# Patient Record
Sex: FEMALE | Race: BLACK OR AFRICAN AMERICAN | URBAN - METROPOLITAN AREA
[De-identification: names, ages, dates, MRNs, and addresses within clinical notes are randomized per-mention and may not be internally consistent; named-entity substitution may affect disease eponyms.]

---

## 2020-06-08 ENCOUNTER — LAB OUTSIDE AN ENCOUNTER (OUTPATIENT)
Dept: URBAN - METROPOLITAN AREA CLINIC 82 | Facility: CLINIC | Age: 80
End: 2020-06-08

## 2020-06-08 LAB
CREATININE POC: 1.3
PERFORMING LAB: (no result)

## 2020-07-10 ENCOUNTER — OFFICE VISIT (OUTPATIENT)
Dept: URBAN - METROPOLITAN AREA SURGERY CENTER 13 | Facility: SURGERY CENTER | Age: 80
End: 2020-07-10
Payer: MEDICARE

## 2020-07-10 DIAGNOSIS — Z86.010 H/O ADENOMATOUS POLYP OF COLON: ICD-10-CM

## 2020-07-10 PROCEDURE — G0105 COLORECTAL SCRN; HI RISK IND: HCPCS | Performed by: INTERNAL MEDICINE

## 2020-07-10 PROCEDURE — G9936 PMH PLYP/NEO CO/RECT/JUN/ANS: HCPCS | Performed by: INTERNAL MEDICINE

## 2020-07-10 PROCEDURE — G8907 PT DOC NO EVENTS ON DISCHARG: HCPCS | Performed by: INTERNAL MEDICINE

## 2020-07-10 RX ORDER — LISINOPRIL 10 MG/1
TAKE 1 TABLET (10 MG) BY ORAL ROUTE ONCE DAILY TABLET ORAL 1
Qty: 0 | Refills: 0 | Status: ACTIVE | COMMUNITY
Start: 1900-01-01 | End: 1900-01-01

## 2021-10-05 ENCOUNTER — TELEPHONE ENCOUNTER (OUTPATIENT)
Dept: URBAN - METROPOLITAN AREA CLINIC 82 | Facility: CLINIC | Age: 81
End: 2021-10-05

## 2021-10-05 ENCOUNTER — OFFICE VISIT (OUTPATIENT)
Dept: URBAN - METROPOLITAN AREA CLINIC 82 | Facility: CLINIC | Age: 81
End: 2021-10-05
Payer: MEDICARE

## 2021-10-05 ENCOUNTER — LAB OUTSIDE AN ENCOUNTER (OUTPATIENT)
Dept: URBAN - METROPOLITAN AREA CLINIC 82 | Facility: CLINIC | Age: 81
End: 2021-10-05

## 2021-10-05 VITALS
SYSTOLIC BLOOD PRESSURE: 170 MMHG | BODY MASS INDEX: 21.3 KG/M2 | HEIGHT: 70 IN | HEART RATE: 101 BPM | TEMPERATURE: 97.2 F | WEIGHT: 148.8 LBS | DIASTOLIC BLOOD PRESSURE: 84 MMHG

## 2021-10-05 DIAGNOSIS — Z86.010 PERSONAL HISTORY OF COLON POLYPS: ICD-10-CM

## 2021-10-05 DIAGNOSIS — Z80.0 FAMILY HISTORY OF PANCREATIC CANCER: ICD-10-CM

## 2021-10-05 DIAGNOSIS — K86.1 CHRONIC PANCREATITIS: ICD-10-CM

## 2021-10-05 DIAGNOSIS — D49.0 IPMN (INTRADUCTAL PAPILLARY MUCINOUS NEOPLASM): ICD-10-CM

## 2021-10-05 PROCEDURE — G8418 CALC BMI BLW LOW PARAM F/U: HCPCS | Performed by: INTERNAL MEDICINE

## 2021-10-05 PROCEDURE — G9903 PT SCRN TBCO ID AS NON USER: HCPCS | Performed by: INTERNAL MEDICINE

## 2021-10-05 PROCEDURE — 99214 OFFICE O/P EST MOD 30 MIN: CPT | Performed by: INTERNAL MEDICINE

## 2021-10-05 PROCEDURE — G8427 DOCREV CUR MEDS BY ELIG CLIN: HCPCS | Performed by: INTERNAL MEDICINE

## 2021-10-05 RX ORDER — PARICALCITOL 1 UG/1
1 CAPSULE CAPSULE, LIQUID FILLED ORAL
Status: ACTIVE | COMMUNITY

## 2021-10-05 RX ORDER — AMLODIPINE BESYLATE 2.5 MG
1 TABLET TABLET ORAL ONCE A DAY
Status: ACTIVE | COMMUNITY

## 2021-10-05 RX ORDER — LISINOPRIL 10 MG/1
TAKE 1 TABLET (10 MG) BY ORAL ROUTE ONCE DAILY TABLET ORAL 1
Qty: 0 | Refills: 0 | Status: ACTIVE | COMMUNITY
Start: 1900-01-01

## 2021-10-05 NOTE — HPI-TODAY'S VISIT:
10/5/2021 Patient presents for annual GI visit. She is currently recovering from knee replacement surgery in April. Her appetite is good and her weight is stable. She denies any present GI symptoms. Recent labs from May with Dr. Lynn showed patient was anemic but now blood count is normalized. Kidney function is slightly low, being monitored by her nephrologist.

## 2021-10-05 NOTE — HPI-OTHER HISTORIES
The patient is a 79 year old /Black female, who presents for the follow-up evaluation of GERD.  Since the last visit the patient is doing well. There are no new complaints. The present regimen is being followed. The patient has had an EGD. The EGD showed the findings that are listed in the data base of the chart. I reviewed these with the patient.She Denies of jaundice. Occasional abdominal pain. Acid reflux is under control. No loss of weight and appetite. Denies of rectal bleeding. no nauea.no GI bleeind. underwent EUS.     04/27/2016 Previous colonoscopy in 2014 which showed small polyp. Last EUS was in 2014. Denies alcohol consumption. No new sx. No abdominal pain. C/o occasional gas depending on the foods she eats. Denies heartburn/acid reflux. Denies fever, chills, nausea, jaundice or ascites. Recent blood work with Dr. Lynn 2 wks ago with nl results. RBC were elevated. Seeing nephrologist for kidney issues.  06/23/2016 Labs on 4/27 showed elevated creatine. Pt's labs show early signs of CKD.  EUS on 6/7 showed early signs of pancreatitis with increased lobularity with hyperchoic foci, dilated bile duct to 2mm. Pt has chronic kidney issues. Her nephrologist is Dr. Jones. Pt takes lisinopril. She could not tolerate Norvass.  02/16/2017 Last colonoscopy done in 2014 showed multiple polyps and diverticulosis. She denies new symptoms. She also denies heartburn and acid reflux. Patient suspects that she may have hemorrhoids. She says she had labs done with her nephrologist recently which revealed low sodium. Denies weightloss and constipation.  06/08/2017 Colonoscopy done on 04/21/17 was normal. Patient says she is doing well. Heartburn/acid reflux improved with dietary changes. Patient's last EUS was on 06/07/16 and she is currently due for a surveillance EUS. Denies dysphagia, abdominal pain and weightloss. She does have a history of chronic pancreatitis.  05/23/2018 EUS done on 6/20/17 for family history of pancreatic cancer and dilated pancreatic duct, did show dilatated pancreatic duct to 2.5 mm possible from IPMN. EUS in 2015 & 2016 showed 2.3 m pancreatic duct.  Patient denies abdominal pain. Denies issues with stomach when eating, her nephrologist told her that he wanted her to drink more water. States she takes Lisinopril. Patient had knee replacement surgery, and had blood clots afterwards, was placed on blood thinners for about 5 months and then was put on Aspirin. Patient taked Dulcolax for constipation, states she watches her diet to keep heartburn symptoms under control. Denies jaudnice, weight loss, and GI bleeding.  07/18/2018 Patient states she feels better, after developing pancreatitis after EUS. FNA of the pancreatic fluid showed no malignant cells.  Patient still admits to occasional tenderness. Patient denies nausea.  6/5/2019 Patient states she is feeling well. She denies any abdominal pain. She denies any heartburn and acid reflux. Patient states she was so sick after the previous procedure, EUS with biopsy. She states she prefers to only have a blood test. She complains of arthritis. Denies any jaundice, bloating or weight loss.  05/20/2020  I called the patient. She states she is due for a colonoscopy. Previous colonoscopy was done 3 years ago. Patient states she is feeling good. She denies any new symptoms. No abdominal pain. Denies any jaundice. No weight loss.

## 2021-10-09 LAB — CA 19-9: 27

## 2021-10-19 ENCOUNTER — LAB OUTSIDE AN ENCOUNTER (OUTPATIENT)
Dept: URBAN - METROPOLITAN AREA CLINIC 82 | Facility: CLINIC | Age: 81
End: 2021-10-19

## 2021-10-22 LAB
CREATININE POC: 1.4
PERFORMING LAB: (no result)

## 2021-11-03 ENCOUNTER — LAB OUTSIDE AN ENCOUNTER (OUTPATIENT)
Dept: URBAN - METROPOLITAN AREA CLINIC 82 | Facility: CLINIC | Age: 81
End: 2021-11-03

## 2021-11-03 LAB
CREATININE POC: 1.3
PERFORMING LAB: (no result)

## 2021-11-17 ENCOUNTER — TELEPHONE ENCOUNTER (OUTPATIENT)
Dept: URBAN - METROPOLITAN AREA CLINIC 115 | Facility: CLINIC | Age: 81
End: 2021-11-17

## 2021-11-22 ENCOUNTER — TELEPHONE ENCOUNTER (OUTPATIENT)
Dept: URBAN - METROPOLITAN AREA CLINIC 23 | Facility: CLINIC | Age: 81
End: 2021-11-22

## 2022-11-17 ENCOUNTER — OFFICE VISIT (OUTPATIENT)
Dept: URBAN - METROPOLITAN AREA CLINIC 115 | Facility: CLINIC | Age: 82
End: 2022-11-17
Payer: MEDICARE

## 2022-11-17 ENCOUNTER — LAB OUTSIDE AN ENCOUNTER (OUTPATIENT)
Dept: URBAN - METROPOLITAN AREA CLINIC 115 | Facility: CLINIC | Age: 82
End: 2022-11-17

## 2022-11-17 VITALS
BODY MASS INDEX: 24.08 KG/M2 | DIASTOLIC BLOOD PRESSURE: 87 MMHG | TEMPERATURE: 97.5 F | HEIGHT: 70 IN | HEART RATE: 92 BPM | WEIGHT: 168.2 LBS | SYSTOLIC BLOOD PRESSURE: 173 MMHG

## 2022-11-17 DIAGNOSIS — K86.1 CHRONIC PANCREATITIS: ICD-10-CM

## 2022-11-17 DIAGNOSIS — D49.0 IPMN (INTRADUCTAL PAPILLARY MUCINOUS NEOPLASM): ICD-10-CM

## 2022-11-17 DIAGNOSIS — Z80.0 FAMILY HISTORY OF PANCREATIC CANCER: ICD-10-CM

## 2022-11-17 DIAGNOSIS — Z86.010 PERSONAL HISTORY OF COLON POLYPS: ICD-10-CM

## 2022-11-17 PROBLEM — 428283002 HISTORY OF POLYP OF COLON: Status: ACTIVE | Noted: 2021-10-05

## 2022-11-17 PROCEDURE — G8418 CALC BMI BLW LOW PARAM F/U: HCPCS | Performed by: INTERNAL MEDICINE

## 2022-11-17 PROCEDURE — G9903 PT SCRN TBCO ID AS NON USER: HCPCS | Performed by: INTERNAL MEDICINE

## 2022-11-17 PROCEDURE — G8427 DOCREV CUR MEDS BY ELIG CLIN: HCPCS | Performed by: INTERNAL MEDICINE

## 2022-11-17 PROCEDURE — 99214 OFFICE O/P EST MOD 30 MIN: CPT | Performed by: INTERNAL MEDICINE

## 2022-11-17 RX ORDER — LISINOPRIL 10 MG/1
TAKE 1 TABLET (10 MG) BY ORAL ROUTE ONCE DAILY TABLET ORAL 1
Qty: 0 | Refills: 0 | Status: ACTIVE | COMMUNITY
Start: 1900-01-01

## 2022-11-17 RX ORDER — AMLODIPINE BESYLATE 2.5 MG
1 TABLET TABLET ORAL ONCE A DAY
Status: ACTIVE | COMMUNITY

## 2022-11-17 RX ORDER — PARICALCITOL 1 UG/1
1 CAPSULE CAPSULE, LIQUID FILLED ORAL
Status: ACTIVE | COMMUNITY

## 2022-11-17 NOTE — HPI-TODAY'S VISIT:
10/5/2021 Patient presents for annual GI visit. She is currently recovering from knee replacement surgery in April. Her appetite is good and her weight is stable. She denies any present GI symptoms. Recent labs from May with Dr. Lynn showed patient was anemic but now blood count is normalized. Kidney function is slightly low, being monitored by her nephrologist.  11/17/2022 Patient presents for an MRI and colonoscopy consult. Patient denies any symptoms or issues with her stomach. Patient states that the only issue, if anything is gas sometimes. She states that she has not gotten an MRI since 2019. Patient states that her mother did colon cancer. She denies any abdominal pain, but she does have some pain when she has gas.

## 2022-11-18 LAB — CA 19-9: 19

## 2023-03-30 ENCOUNTER — OFFICE VISIT (OUTPATIENT)
Dept: URBAN - METROPOLITAN AREA CLINIC 115 | Facility: CLINIC | Age: 83
End: 2023-03-30
Payer: MEDICARE

## 2023-03-30 ENCOUNTER — WEB ENCOUNTER (OUTPATIENT)
Dept: URBAN - METROPOLITAN AREA CLINIC 115 | Facility: CLINIC | Age: 83
End: 2023-03-30

## 2023-03-30 VITALS
SYSTOLIC BLOOD PRESSURE: 157 MMHG | TEMPERATURE: 97.7 F | DIASTOLIC BLOOD PRESSURE: 92 MMHG | HEIGHT: 70 IN | BODY MASS INDEX: 22.76 KG/M2 | WEIGHT: 159 LBS | HEART RATE: 104 BPM

## 2023-03-30 DIAGNOSIS — D49.0 IPMN (INTRADUCTAL PAPILLARY MUCINOUS NEOPLASM): ICD-10-CM

## 2023-03-30 DIAGNOSIS — Z80.0 FAMILY HISTORY OF PANCREATIC CANCER: ICD-10-CM

## 2023-03-30 DIAGNOSIS — Z86.010 PERSONAL HISTORY OF COLON POLYPS: ICD-10-CM

## 2023-03-30 DIAGNOSIS — K86.1 CHRONIC PANCREATITIS: ICD-10-CM

## 2023-03-30 PROCEDURE — G8420 CALC BMI NORM PARAMETERS: HCPCS | Performed by: INTERNAL MEDICINE

## 2023-03-30 PROCEDURE — G8427 DOCREV CUR MEDS BY ELIG CLIN: HCPCS | Performed by: INTERNAL MEDICINE

## 2023-03-30 PROCEDURE — G9903 PT SCRN TBCO ID AS NON USER: HCPCS | Performed by: INTERNAL MEDICINE

## 2023-03-30 PROCEDURE — 99214 OFFICE O/P EST MOD 30 MIN: CPT | Performed by: INTERNAL MEDICINE

## 2023-03-30 RX ORDER — LISINOPRIL 10 MG/1
TAKE 1 TABLET (10 MG) BY ORAL ROUTE ONCE DAILY TABLET ORAL 1
Qty: 0 | Refills: 0 | Status: ACTIVE | COMMUNITY
Start: 1900-01-01

## 2023-03-30 RX ORDER — PARICALCITOL 1 UG/1
1 CAPSULE CAPSULE, LIQUID FILLED ORAL
Status: ACTIVE | COMMUNITY

## 2023-03-30 RX ORDER — AMLODIPINE BESYLATE 2.5 MG
1 TABLET TABLET ORAL ONCE A DAY
Status: ACTIVE | COMMUNITY

## 2023-03-30 NOTE — HPI-OTHER HISTORIES
The patient is a 79 year old /Black female, who presents for the follow-up evaluation of GERD.  Since the last visit the patient is doing well. There are no new complaints. The present regimen is being followed. The patient has had an EGD. The EGD showed the findings that are listed in the data base of the chart. I reviewed these with the patient.She Denies of jaundice. Occasional abdominal pain. Acid reflux is under control. No loss of weight and appetite. Denies of rectal bleeding. no nauea.no GI bleeind. underwent EUS.     04/27/2016 Previous colonoscopy in 2014 which showed small polyp. Last EUS was in 2014. Denies alcohol consumption. No new sx. No abdominal pain. C/o occasional gas depending on the foods she eats. Denies heartburn/acid reflux. Denies fever, chills, nausea, jaundice or ascites. Recent blood work with Dr. Lynn 2 wks ago with nl results. RBC were elevated. Seeing nephrologist for kidney issues.  06/23/2016 Labs on 4/27 showed elevated creatine. Pt's labs show early signs of CKD.  EUS on 6/7 showed early signs of pancreatitis with increased lobularity with hyperchoic foci, dilated bile duct to 2mm. Pt has chronic kidney issues. Her nephrologist is Dr. Jonse. Pt takes lisinopril. She could not tolerate Norvass.  02/16/2017 Last colonoscopy done in 2014 showed multiple polyps and diverticulosis. She denies new symptoms. She also denies heartburn and acid reflux. Patient suspects that she may have hemorrhoids. She says she had labs done with her nephrologist recently which revealed low sodium. Denies weightloss and constipation.  06/08/2017 Colonoscopy done on 04/21/17 was normal. Patient says she is doing well. Heartburn/acid reflux improved with dietary changes. Patient's last EUS was on 06/07/16 and she is currently due for a surveillance EUS. Denies dysphagia, abdominal pain and weightloss. She does have a history of chronic pancreatitis.  05/23/2018 EUS done on 6/20/17 for family history of pancreatic cancer and dilated pancreatic duct, did show dilatated pancreatic duct to 2.5 mm possible from IPMN. EUS in 2015 & 2016 showed 2.3 m pancreatic duct.  Patient denies abdominal pain. Denies issues with stomach when eating, her nephrologist told her that he wanted her to drink more water. States she takes Lisinopril. Patient had knee replacement surgery, and had blood clots afterwards, was placed on blood thinners for about 5 months and then was put on Aspirin. Patient taked Dulcolax for constipation, states she watches her diet to keep heartburn symptoms under control. Denies jaudnice, weight loss, and GI bleeding.  07/18/2018 Patient states she feels better, after developing pancreatitis after EUS. FNA of the pancreatic fluid showed no malignant cells.  Patient still admits to occasional tenderness. Patient denies nausea.  6/5/2019 Patient states she is feeling well. She denies any abdominal pain. She denies any heartburn and acid reflux. Patient states she was so sick after the previous procedure, EUS with biopsy. She states she prefers to only have a blood test. She complains of arthritis. Denies any jaundice, bloating or weight loss.  05/20/2020  I called the patient. She states she is due for a colonoscopy. Previous colonoscopy was done 3 years ago. Patient states she is feeling good. She denies any new symptoms. No abdominal pain. Denies any jaundice. No weight loss.
(0) independent

## 2023-03-30 NOTE — HPI-TODAY'S VISIT:
10/5/2021 Patient presents for annual GI visit. She is currently recovering from knee replacement surgery in April. Her appetite is good and her weight is stable. She denies any present GI symptoms. Recent labs from May with Dr. Lynn showed patient was anemic but now blood count is normalized. Kidney function is slightly low, being monitored by her nephrologist.  11/17/2022 Patient presents for an MRI and colonoscopy consult. Patient denies any symptoms or issues with her stomach. Patient states that the only issue, if anything is gas sometimes. She states that she has not gotten an MRI since 2019. Patient states that her mother did colon cancer. She denies any abdominal pain, but she does have some pain when she has gas.  3/30/2023 Patient presents for a follow up. Patient is currently using a walker for knee surgery that was done in January. Patient denies any stomach issues. Patient states that she is concerned about not being able to do the colonoscopy anymore, but she is not having any symptoms. Patient denies any new symptoms and is doing well.

## 2023-04-03 LAB
A/G RATIO: 1.1
ALBUMIN: 4.2
ALKALINE PHOSPHATASE: 96
ALT (SGPT): 8
AST (SGOT): 15
BILIRUBIN, TOTAL: 0.4
BUN/CREATININE RATIO: 18
BUN: 31
CA 19-9: 26
CALCIUM: 9.5
CARBON DIOXIDE, TOTAL: 22
CHLORIDE: 104
CREATININE: 1.72
EGFR: 30
GLOBULIN, TOTAL: 4
GLUCOSE: 68
POTASSIUM: 4.1
PROTEIN, TOTAL: 8.2
SODIUM: 140

## 2023-09-28 ENCOUNTER — OFFICE VISIT (OUTPATIENT)
Dept: URBAN - METROPOLITAN AREA CLINIC 115 | Facility: CLINIC | Age: 83
End: 2023-09-28

## 2024-02-01 ENCOUNTER — LAB (OUTPATIENT)
Dept: URBAN - METROPOLITAN AREA CLINIC 115 | Facility: CLINIC | Age: 84
End: 2024-02-01

## 2024-02-01 ENCOUNTER — OV EP (OUTPATIENT)
Dept: URBAN - METROPOLITAN AREA CLINIC 115 | Facility: CLINIC | Age: 84
End: 2024-02-01
Payer: MEDICARE

## 2024-02-01 VITALS
HEIGHT: 70 IN | BODY MASS INDEX: 25.34 KG/M2 | DIASTOLIC BLOOD PRESSURE: 81 MMHG | TEMPERATURE: 97.5 F | HEART RATE: 76 BPM | WEIGHT: 177 LBS | SYSTOLIC BLOOD PRESSURE: 181 MMHG

## 2024-02-01 DIAGNOSIS — Z86.010 PERSONAL HISTORY OF COLON POLYPS: ICD-10-CM

## 2024-02-01 DIAGNOSIS — Z80.0 FAMILY HISTORY OF PANCREATIC CANCER: ICD-10-CM

## 2024-02-01 DIAGNOSIS — K86.1 CHRONIC PANCREATITIS: ICD-10-CM

## 2024-02-01 DIAGNOSIS — D49.0 IPMN (INTRADUCTAL PAPILLARY MUCINOUS NEOPLASM): ICD-10-CM

## 2024-02-01 PROCEDURE — 99214 OFFICE O/P EST MOD 30 MIN: CPT | Performed by: INTERNAL MEDICINE

## 2024-02-01 RX ORDER — PARICALCITOL 1 UG/1
1 CAPSULE CAPSULE, LIQUID FILLED ORAL
Status: ACTIVE | COMMUNITY

## 2024-02-01 RX ORDER — LISINOPRIL 10 MG/1
TAKE 1 TABLET (10 MG) BY ORAL ROUTE ONCE DAILY TABLET ORAL 1
Qty: 0 | Refills: 0 | Status: ACTIVE | COMMUNITY
Start: 1900-01-01

## 2024-02-01 RX ORDER — APIXABAN 5 MG/1
1 TABLET TABLET, FILM COATED ORAL TWICE A DAY
Status: ACTIVE | COMMUNITY

## 2024-02-01 RX ORDER — AMLODIPINE BESYLATE 2.5 MG
1 TABLET TABLET ORAL ONCE A DAY
Status: ACTIVE | COMMUNITY

## 2024-02-01 RX ORDER — PRAVASTATIN SODIUM 40 MG/1
1 TABLET TABLET ORAL ONCE A DAY
Status: ACTIVE | COMMUNITY

## 2024-02-01 NOTE — HPI-TODAY'S VISIT:
10/5/2021 Patient presents for annual GI visit. She is currently recovering from knee replacement surgery in April. Her appetite is good and her weight is stable. She denies any present GI symptoms. Recent labs from May with Dr. Lynn showed patient was anemic but now blood count is normalized. Kidney function is slightly low, being monitored by her nephrologist.  11/17/2022 Patient presents for an MRI and colonoscopy consult. Patient denies any symptoms or issues with her stomach. Patient states that the only issue, if anything is gas sometimes. She states that she has not gotten an MRI since 2019. Patient states that her mother did colon cancer. She denies any abdominal pain, but she does have some pain when she has gas.  3/30/2023 Patient presents for a follow up. Patient is currently using a walker for knee surgery that was done in January. Patient denies any stomach issues. Patient states that she is concerned about not being able to do the colonoscopy anymore, but she is not having any symptoms. Patient denies any new symptoms and is doing well.  2/1/2024 Patient presents for a follow up. She denies heartburn or acid reflux. Patient states that she has been having a very great appetite. She denies having any new symptoms or issues.

## 2024-02-05 LAB
A/G RATIO: 1.2
ABSOLUTE BASOPHILS: 9
ABSOLUTE EOSINOPHILS: 0
ABSOLUTE LYMPHOCYTES: 1600
ABSOLUTE MONOCYTES: 257
ABSOLUTE NEUTROPHILS: 1234
ALBUMIN: 4.4
ALKALINE PHOSPHATASE: 75
ALT (SGPT): 9
AST (SGOT): 19
BASOPHILS: 0.3
BILIRUBIN, TOTAL: 0.5
BUN/CREATININE RATIO: 17
BUN: 28
CA 19-9: 26
CALCIUM: 9.8
CARBON DIOXIDE, TOTAL: 24
CHLORIDE: 102
CREATININE: 1.64
EGFR: 31
EOSINOPHILS: 0
GLOBULIN, TOTAL: 3.6
GLUCOSE: 81
HEMATOCRIT: 38.9
HEMOGLOBIN: 12.4
LYMPHOCYTES: 51.6
MCH: 26.7
MCHC: 31.9
MCV: 83.8
MONOCYTES: 8.3
NEUTROPHILS: 39.8
PLATELET COUNT: 127
POTASSIUM: 4.8
PROTEIN, TOTAL: 8
RDW: 13.7
RED BLOOD CELL COUNT: 4.64
SODIUM: 140
WHITE BLOOD CELL COUNT: 3.1

## 2025-02-06 ENCOUNTER — DASHBOARD ENCOUNTERS (OUTPATIENT)
Age: 85
End: 2025-02-06

## 2025-02-06 ENCOUNTER — OFFICE VISIT (OUTPATIENT)
Dept: URBAN - METROPOLITAN AREA CLINIC 115 | Facility: CLINIC | Age: 85
End: 2025-02-06
Payer: MEDICARE

## 2025-02-06 ENCOUNTER — LAB OUTSIDE AN ENCOUNTER (OUTPATIENT)
Dept: URBAN - METROPOLITAN AREA CLINIC 115 | Facility: CLINIC | Age: 85
End: 2025-02-06

## 2025-02-06 VITALS
WEIGHT: 158 LBS | BODY MASS INDEX: 22.62 KG/M2 | TEMPERATURE: 97.1 F | HEART RATE: 79 BPM | SYSTOLIC BLOOD PRESSURE: 116 MMHG | HEIGHT: 70 IN | DIASTOLIC BLOOD PRESSURE: 75 MMHG

## 2025-02-06 DIAGNOSIS — D49.0 IPMN (INTRADUCTAL PAPILLARY MUCINOUS NEOPLASM): ICD-10-CM

## 2025-02-06 DIAGNOSIS — K86.1 CHRONIC PANCREATITIS: ICD-10-CM

## 2025-02-06 DIAGNOSIS — Z86.0100 PERSONAL HISTORY OF COLONIC POLYPS: ICD-10-CM

## 2025-02-06 DIAGNOSIS — Z80.0 FAMILY HISTORY OF PANCREATIC CANCER: ICD-10-CM

## 2025-02-06 PROCEDURE — 99214 OFFICE O/P EST MOD 30 MIN: CPT | Performed by: INTERNAL MEDICINE

## 2025-02-06 RX ORDER — AMLODIPINE BESYLATE 2.5 MG
1 TABLET TABLET ORAL ONCE A DAY
Status: ACTIVE | COMMUNITY

## 2025-02-06 RX ORDER — APIXABAN 5 MG/1
1 TABLET TABLET, FILM COATED ORAL TWICE A DAY
Status: ACTIVE | COMMUNITY

## 2025-02-06 RX ORDER — PARICALCITOL 1 UG/1
1 CAPSULE CAPSULE, LIQUID FILLED ORAL
Status: ACTIVE | COMMUNITY

## 2025-02-06 RX ORDER — LISINOPRIL 10 MG/1
TAKE 1 TABLET (10 MG) BY ORAL ROUTE ONCE DAILY TABLET ORAL 1
Qty: 0 | Refills: 0 | Status: ACTIVE | COMMUNITY
Start: 1900-01-01

## 2025-02-06 RX ORDER — PRAVASTATIN SODIUM 40 MG/1
1 TABLET TABLET ORAL ONCE A DAY
Status: ACTIVE | COMMUNITY

## 2025-02-06 NOTE — HPI-TODAY'S VISIT:
10/5/2021 Patient presents for annual GI visit. She is currently recovering from knee replacement surgery in April. Her appetite is good and her weight is stable. She denies any present GI symptoms. Recent labs from May with Dr. Lynn showed patient was anemic but now blood count is normalized. Kidney function is slightly low, being monitored by her nephrologist.  11/17/2022 Patient presents for an MRI and colonoscopy consult. Patient denies any symptoms or issues with her stomach. Patient states that the only issue, if anything is gas sometimes. She states that she has not gotten an MRI since 2019. Patient states that her mother did colon cancer. She denies any abdominal pain, but she does have some pain when she has gas.  3/30/2023 Patient presents for a follow up. Patient is currently using a walker for knee surgery that was done in January. Patient denies any stomach issues. Patient states that she is concerned about not being able to do the colonoscopy anymore, but she is not having any symptoms. Patient denies any new symptoms and is doing well.  2/1/2024 Patient presents for a follow up. She denies heartburn or acid reflux. Patient states that she has been having a very great appetite. She denies having any new symptoms or issues. 2/6/2025 Patient came for a follow-up says she did not have any new symptoms she does occasionally feels weird sensation in the bellybutton.  She also she is still feeling weak and her  passed away last year with pancreatic cancer patient.  She does have sister with the pancreatic cancer and her mother had a colon cancer.  She is requesting about colonoscopy.  No loss of weight no loss of appetite.  No heartburn.  No jaundice

## 2025-02-07 LAB — CA 19-9: 25

## 2025-02-13 ENCOUNTER — WEB ENCOUNTER (OUTPATIENT)
Dept: URBAN - METROPOLITAN AREA CLINIC 92 | Facility: CLINIC | Age: 85
End: 2025-02-13

## 2025-02-17 ENCOUNTER — TELEPHONE ENCOUNTER (OUTPATIENT)
Dept: URBAN - METROPOLITAN AREA CLINIC 115 | Facility: CLINIC | Age: 85
End: 2025-02-17

## 2025-03-05 ENCOUNTER — TELEPHONE ENCOUNTER (OUTPATIENT)
Dept: URBAN - METROPOLITAN AREA CLINIC 115 | Facility: CLINIC | Age: 85
End: 2025-03-05

## 2025-03-05 ENCOUNTER — LAB OUTSIDE AN ENCOUNTER (OUTPATIENT)
Dept: URBAN - METROPOLITAN AREA CLINIC 115 | Facility: CLINIC | Age: 85
End: 2025-03-05

## 2025-08-07 ENCOUNTER — OFFICE VISIT (OUTPATIENT)
Dept: URBAN - METROPOLITAN AREA CLINIC 115 | Facility: CLINIC | Age: 85
End: 2025-08-07